# Patient Record
Sex: MALE | Race: WHITE | Employment: FULL TIME | URBAN - METROPOLITAN AREA
[De-identification: names, ages, dates, MRNs, and addresses within clinical notes are randomized per-mention and may not be internally consistent; named-entity substitution may affect disease eponyms.]

---

## 2019-02-21 ENCOUNTER — OFFICE VISIT (OUTPATIENT)
Dept: NEUROLOGY | Age: 49
End: 2019-02-21

## 2019-02-21 VITALS
SYSTOLIC BLOOD PRESSURE: 120 MMHG | WEIGHT: 212 LBS | HEIGHT: 72 IN | BODY MASS INDEX: 28.71 KG/M2 | DIASTOLIC BLOOD PRESSURE: 78 MMHG

## 2019-02-21 DIAGNOSIS — R29.898 LEFT ARM WEAKNESS: ICD-10-CM

## 2019-02-21 DIAGNOSIS — R20.0 NUMBNESS OF UPPER LIMB: ICD-10-CM

## 2019-02-21 DIAGNOSIS — M54.41 BILATERAL LOW BACK PAIN WITH BILATERAL SCIATICA, UNSPECIFIED CHRONICITY: ICD-10-CM

## 2019-02-21 DIAGNOSIS — M54.42 BILATERAL LOW BACK PAIN WITH BILATERAL SCIATICA, UNSPECIFIED CHRONICITY: ICD-10-CM

## 2019-02-21 DIAGNOSIS — M54.16 LUMBAR RADICULITIS: ICD-10-CM

## 2019-02-21 DIAGNOSIS — M54.2 NECK PAIN: ICD-10-CM

## 2019-02-21 DIAGNOSIS — R29.2 ABNORMAL DTR (DEEP TENDON REFLEX): ICD-10-CM

## 2019-02-21 DIAGNOSIS — M54.2 NECK PAIN: Primary | ICD-10-CM

## 2019-02-21 RX ORDER — CYCLOBENZAPRINE HCL 10 MG
10 TABLET ORAL
COMMUNITY

## 2019-02-21 RX ORDER — TADALAFIL 5 MG/1
5 TABLET ORAL
COMMUNITY

## 2019-02-21 RX ORDER — IBUPROFEN 800 MG/1
TABLET ORAL
COMMUNITY

## 2019-02-21 RX ORDER — CEPHALEXIN 500 MG/1
500 CAPSULE ORAL 2 TIMES DAILY
COMMUNITY
End: 2019-03-27 | Stop reason: ALTCHOICE

## 2019-02-21 NOTE — PROCEDURES
EMG/ NCS Report  DR[facility] REHABILITATION  - DAY ONE [facility]ID[facility]  [facility]  [facility], [address]   [address]   Ph: [phone]   FAX: [phone]/ [phone]  Test Date:  2019    Patient: Shannon Range : 1970 Physician: Jarvis Khan MD   Sex: Male Height: ' \" Ref Phys: Kita Nuñez MD   ID#: [id number] Weight:  lbs. Technician: Paddy Singh     Patient History / Exam:  Phan Reyes S/P C6-C7 ACDF  (+) L neck pain and L shoulder pain    Patient is coming for radiculopathy evaluation. EMG & NCV Findings:  Evaluation of the left median motor and the right median motor nerves showed normal distal onset latency (L3.2, R3.1 ms), normal amplitude (L11.2, R9.0 mV), and normal conduction velocity (Elbow-Wrist, L49, R50 m/s). The left ulnar motor and the right ulnar motor nerves showed normal distal onset latency (L2.7, R2.6 ms), normal amplitude (L12.2, R10.0 mV), normal conduction velocity (B Elbow-Wrist, L56, R66 m/s), and normal conduction velocity (A Elbow-B Elbow, L59, R62 m/s). The left median sensory, the right median sensory, the left radial sensory, the right radial sensory, the left ulnar sensory, and the right ulnar sensory nerves showed normal distal peak latency (L3.2, R2.9, L2.4, R2.0, L3.4, R2.9 ms) and normal amplitude (L38.2, R45.3, L53.4, R64.5, L32.2, R32.2 µV). All left vs. right side differences were within normal limits. All F Wave latencies were within normal limits. All F Wave left vs. right side latency differences were within normal limits. All examined muscles (as indicated in the following table) showed no evidence of electrical instability. Impression:    Extensive electrodiagnostic examination of the left and right upper extremities is normal.    Specifically, there is no evidence of a peripheral neuropathy or cervical motor radiculopathy.         Jarvis Khan MD  Diplomate, American Board of Psychiatry and Neurology  Diplomate, Neuromuscular Medicine  Diplomate, American Board of Electrodiagnostic Medicine  Director, [address] [place] [address] Accredited Laboratory with Exemplary Status        Nerve Conduction Studies  Anti Sensory Summary Table     Stim Site NR Peak (ms) Norm Peak (ms) P-T Amp (µV) Norm P-T Amp Site1 Site2 Dist (cm)   Left Median Anti Sensory (2nd Digit)  28.8°C   Wrist    3.2 <4 38.2 >13 Wrist 2nd Digit 14.0   Right Median Anti Sensory (2nd Digit)  31.7°C   Wrist    2.9 <4 45.3 >13 Wrist 2nd Digit 14.0   Left Radial Anti Sensory (Base 1st Digit)  29.6°C   Wrist    2.4 <2.8 53.4 >11 Wrist Base 1st Digit 10.0   Right Radial Anti Sensory (Base 1st Digit)  31.3°C   Wrist    2.0 <2.8 64.5 >11 Wrist Base 1st Digit 10.0   Left Ulnar Anti Sensory (5th Digit)  29.3°C   Wrist    3.4 <4.0 32.2 >9 Wrist 5th Digit 14.0   Right Ulnar Anti Sensory (5th Digit)  31.3°C   Wrist    2.9 <4.0 32.2 >9 Wrist 5th Digit 14.0     Motor Summary Table     Stim Site NR Onset (ms) Norm Onset (ms) O-P Amp (mV) Norm O-P Amp Amp (Prev) (%) Site1 Site2 Dist (cm) Ramos (m/s) Norm Ramos (m/s)   Left Median Motor (Abd Poll Brev)  29.6°C   Wrist    3.2 <4.5 11.2 >4.1 100.0 Wrist Abd Poll Brev 8.0     Elbow    7.7  11.0  98.2 Elbow Wrist 22.0 49 >49   Right Median Motor (Abd Poll Brev)  31°C   Wrist    3.1 <4.5 9.0 >4.1 100.0 Wrist Abd Poll Brev 8.0     Elbow    7.4  10.2  113.3 Elbow Wrist 21.5 50 >49   Left Ulnar Motor (Abd Dig Minimi)  32.5°C   Wrist    2.7 <3.1 12.2 >7.0 100.0 Wrist Abd Dig Minimi 8.0  >50   B Elbow    7.0  10.7  87.7 B Elbow Wrist 24.0 56 >50   A Elbow    8.7  10.5  98.1 A Elbow B Elbow 10.0 59 >50   Right Ulnar Motor (Abd Dig Minimi)  30.7°C   Wrist    2.6 <3.1 10.0 >7.0 100.0 Wrist Abd Dig Minimi 8.0  >50   B Elbow    6.1  9.8  98.0 B Elbow Wrist 23.0 66 >50   A Elbow    7.7  9.7  99.0 A Elbow B Elbow 10.0 62 >50     F Wave Studies     NR F-Lat (ms) Lat Norm (ms) L-R F-Lat (ms) L-R Lat Norm   Left Ulnar (Mrkrs) (Abd Dig Min)  31.1°C 27.36 <32 0.00 <2.5   Right Ulnar (Mrkrs) (Abd Dig Min)  30.8°C      27.36 <32 0.00 <2.5     EMG     Side Muscle Nerve Root Ins Act Fibs Psw Recrt Duration Amp Poly Comment   Left 1stDorInt Ulnar C8-T1 Nml Nml Nml Nml Nml Nml Nml    Left ExtIndicis Radial (Post Int) C7-8 Nml Nml Nml Nml Nml Nml Nml    Left Abd Poll Brev Median C8-T1 Nml Nml Nml Nml Nml Nml Nml    Left PronatorTeres Median C6-7 Nml Nml Nml Nml Nml Nml Nml    Left Biceps Musculocut C5-6 Nml Nml Nml Nml Nml Nml Nml    Left Triceps Radial C6-7-8 Nml Nml Nml Nml Nml Nml Nml    Left Deltoid Axillary C5-6 Nml Nml Nml Nml Nml Nml Nml    Right 1stDorInt Ulnar C8-T1 Nml Nml Nml Nml Nml Nml Nml    Right ExtIndicis Radial (Post Int) C7-8 Nml Nml Nml Nml Nml Nml Nml    Right Abd Poll Brev Median C8-T1 Nml Nml Nml Nml Nml Nml Nml    Right Biceps Musculocut C5-6 Nml Nml Nml Nml Nml Nml Nml    Right Triceps Radial C6-7-8 Nml Nml Nml Nml Nml Nml Nml    Right Deltoid Axillary C5-6 Nml Nml Nml Nml Nml Nml Nml                Nerve Conduction Studies  Anti Sensory Left/Right Comparison     Stim Site L Lat (ms) R Lat (ms) L-R Lat (ms) L Amp (µV) R Amp (µV) L-R Amp (%) Site1 Site2 L Ramos (m/s) R Ramos (m/s) L-R Ramos (m/s)   Median Anti Sensory (2nd Digit)  28.8°C   Wrist 2.6 2.3 0.3 38.2 45.3 15.7 Wrist 2nd Digit 54 61 7   Radial Anti Sensory (Base 1st Digit)  29.6°C   Wrist 1.9 1.5 0.4 53.4 64.5 17.2 Wrist Base 1st Digit 53 67 14   Ulnar Anti Sensory (5th Digit)  29.3°C   Wrist 2.6 2.3 0.3 32.2 32.2 0.0 Wrist 5th Digit 54 61 7     Motor Left/Right Comparison     Stim Site L Lat (ms) R Lat (ms) L-R Lat (ms) L Amp (mV) R Amp (mV) L-R Amp (%) Site1 Site2 L Ramos (m/s) R Ramos (m/s) L-R Ramos (m/s)   Median Motor (Abd Poll Brev)  29.6°C   Wrist 3.2 3.1 0.1 11.2 9.0 19.6 Wrist Abd Poll Brev      Elbow 7.7 7.4 0.3 11.0 10.2 7.3 Elbow Wrist 49 50 1   Ulnar Motor (Abd Dig Minimi)  32.5°C   Wrist 2.7 2.6 0.1 12.2 10.0 18.0 Wrist Abd Dig Minimi      B Elbow 7.0 6.1 0.9 10.7 9.8 8.4 B Elbow Wrist 56 66 10   A Elbow 8.7 7.7 1.0 10.5 9.7 7.6 A Elbow B Elbow 59 62 3         Waveforms:

## 2019-02-21 NOTE — PATIENT INSTRUCTIONS
Advance Directives: Care Instructions Your Care Instructions An advance directive is a legal way to state your wishes at the end of your life. It tells your family and your doctor what to do if you can no longer say what you want. There are two main types of advance directives. You can change them any time that your wishes change. · A living will tells your family and your doctor your wishes about life support and other treatment. · A durable power of  for health care lets you name a person to make treatment decisions for you when you can't speak for yourself. This person is called a health care agent. If you do not have an advance directive, decisions about your medical care may be made by a doctor or a  who doesn't know you. It may help to think of an advance directive as a gift to the people who care for you. If you have one, they won't have to make tough decisions by themselves. Follow-up care is a key part of your treatment and safety. Be sure to make and go to all appointments, and call your doctor if you are having problems. It's also a good idea to know your test results and keep a list of the medicines you take. How can you care for yourself at home? · Discuss your wishes with your loved ones and your doctor. This way, there are no surprises. · Many states have a unique form. Or you might use a universal form that has been approved by many states. This kind of form can sometimes be completed and stored online. Your electronic copy will then be available wherever you have a connection to the Internet. In most cases, doctors will respect your wishes even if you have a form from a different state. · You don't need a  to do an advance directive. But you may want to get legal advice. · Think about these questions when you prepare an advance directive: 
?  Who do you want to make decisions about your medical care if you are not able to? Many people choose a family member or close friend. ? Do you know enough about life support methods that might be used? If not, talk to your doctor so you understand. ? What are you most afraid of that might happen? You might be afraid of having pain, losing your independence, or being kept alive by machines. ? Where would you prefer to die? Choices include your home, a hospital, or a nursing home. ? Would you like to have information about hospice care to support you and your family? ? Do you want to donate organs when you die? ? Do you want certain [employer] practices performed before you die? If so, put your wishes in the advance directive. · Read your advance directive every year, and make changes as needed. When should you call for help? Be sure to contact your doctor if you have any questions. Where can you learn more? Go to http://marie-aniyah.info/. Enter R264 in the search box to learn more about \"Advance Directives: Care Instructions. \" Current as of: April 18, 2018 Content Version: 11.9 © 2337-7833 [employer]. Care instructions adapted under license by [employer] (which disclaims liability or warranty for this information). If you have questions about a medical condition or this instruction, always ask your healthcare professional. [place] [address] any warranty or liability for your use of this information. PRESCRIPTION REFILL POLICY [facility] Statement to Patients April 1, 2014 In an effort to ensure the large volume of patient prescription refills is processed in the most efficient and expeditious manner, we are asking our patients to assist us by calling your Pharmacy for all prescription refills, this will include also your  Mail Order Pharmacy. The pharmacy will contact our office electronically to continue the refill process. Please do not wait until the last minute to call your pharmacy. We need at least 48 hours (2days) to fill prescriptions. We also encourage you to call your pharmacy before going to  your prescription to make sure it is ready. With regard to controlled substance prescription refill requests (narcotic refills) that need to be picked up at our office, we ask your cooperation by providing us with at least 72 hours (3days) notice that you will need a refill. We will not refill narcotic prescription refill requests after 4:00pm on any weekday, Monday through Thursday, or after 2:00pm on Fridays, or on the weekends. We encourage everyone to explore another way of getting your prescription refill request processed using sliceX, our patient web portal through our electronic medical record system. [employer]X is an efficient and effective way to communicate your medication request directly to the office and  downloadable as an rivera on your smart phone . [employer]X also features a review functionality that allows you to view your medication list as well as leave messages for your physician. Are you ready to get connected? If so please review the attatched instructions or speak to any of our staff to get you set up right away! Thank you so much for your cooperation. Should you have any questions please contact our Practice Administrator. The Physicians and Staff,  [address] [place] [address] Neurology [facility]

## 2019-02-21 NOTE — PROGRESS NOTES
[facility] and [address] at [facility] at [address] [place], [address] [address] 
[phone] Office 
[phone] Referring: Felipe Olmos MD 
 
 
Chief Complaint Patient presents with  Neck Pain 43-year-old right-handed gentleman who presents today for neck and shoulder pain and weakness. He tells me back in 2004 he was having pain in his left shoulder and it was felt this was secondary to a torn rotator cuff. He underwent rotator cuff surgery and he continued to have discomfort about the left side of the neck into the left upper extremity. He was then found to have a second problem which was disc herniation C6-C7. He underwent C6-C7 ACDF by Dr. Jacinta Goodrich. Since that time he has continued to have discomfort in the left side of his neck radiating down into the left upper extremity about the shoulder into the pectoralis muscle and also having numbness of the thumb and first digit of the left hand and weakness in that left hand where he sometimes will drop things. He has had cervical injections. He has had multiple rounds of physical therapy. He has had dry needling. He indicates the dry needling in the physical therapy does help him. He has what he calls jumping abnormal type movements in the biceps on the left. He has tremor of the hands as well. He this has been ongoing and has been progressive. No recent injury. No recent advanced imaging. He has seen Dr. Fatemeh Hernández who asked him to be reevaluated to see if there is something else ongoing given the amount of time that has passed and the continuation of his symptoms.   The patient had an EMG prior to his surgery but not one since to determine any residual damage and also to determine whether there may be something new ongoing. Last MRI scan was 2012 and that is reviewed in the electronic medical record. He is currently back in physical therapy and he is getting some dry needling and therapy and again feels it is helpful when he does that. He also notes that he has had low back pain and he says that over the last 3 weeks this has been increasing. He says that he knows he is here for his neck but the way his back is been hurting recently he thinks that his back is more important at this point. No inciting factor. He did not fall. No changes in his activity. 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 He had no change or injury related to that. No recent infection. He says that he has pain bilaterally. Increases with activity. He has burning down his legs. His legs have not given out. Not dragging the leg. No loss of bowel or bladder. He does use some Cialis for erectile dysfunction. No imaging. Chart documentation including office visit from Dr. Akiko Bay from January 21, 2019 was kindly forwarded and reviewed. This outlines history as the patient provided above. Also includes plain films demonstrating intact cervical fusion C6-C7 as well as some straightening of the spine thought to be either positional or secondary to spasm. These films were shot 1/21/2019. Past Medical History:  
Diagnosis Date  High cholesterol Past Surgical History:  
Procedure Laterality Date  HX ORTHOPAEDIC Current Outpatient Medications Medication Sig Dispense Refill  cyclobenzaprine (FLEXERIL) 10 mg tablet Take 10 mg by mouth nightly.  tadalafil (CIALIS) 5 mg tablet Take 5 mg by mouth.  cephALEXin (KEFLEX) 500 mg capsule Take 500 mg by mouth two (2) times a day.  ibuprofen (MOTRIN) 800 mg tablet Take  by mouth.  rosuvastatin (CRESTOR) 5 mg tablet Take 5 mg by mouth nightly.     
 hydrocodone-acetaminophen (LORTAB 10/500)  mg per tablet Take 1 Tab by mouth every six (6) hours as needed for Pain. 30 Tab 0  
 duloxetine (CYMBALTA) 30 mg capsule Take 30 mg by mouth daily.  gabapentin (NEURONTIN) 300 mg capsule Take 300 mg by mouth three (3) times daily.  methylPREDNIsolone (MEDROL, CORA,) 4 mg tablet Take  by mouth. Per dose pack instructions 1 Package 0 Allergies Allergen Reactions  Penicillins Unknown (comments) Social History Tobacco Use  Smoking status: Not on file Substance Use Topics  Alcohol use: Not on file  Drug use: Not on file History reviewed. No pertinent family history. Review of Systems Pertinent positives and negatives as noted with remainder of comprehensive review negative Examination Visit Vitals /78 Ht 6' (1.829 m) Wt 96.2 kg (212 lb) BMI 28.75 kg/m² Pleasant, well appearing gentleman. He is engaging and interactive. His dress and grooming are appropriate. .  No icterus. Oropharynx clear. Supple neck without bruit. No significant spasm today about the paracervical musculature. No significant spasm about the trapezius. No trigger points palpated today. Heart regular. His pulses are symmetrical.  No edema of the lower extremities. Neurologically he is awake alert oriented and conversant. His speech and lungs are normal.  Cognitive function normal.  He has intact cranial nerves II-XII. There is no nystagmus present. He has no pronation and no drift. He has no rest tremor. He has no cogwheeling. There is no significant wasting of intrinsic hand muscles. He has postural tremor of the outstretched hands. No intention on finger-nose-finger. He is able to fully resist in the upper and lower extremities in all muscle groups to direct testing. Intrinsic hand muscles strong. Reflexes are abnormally brisk throughout in the upper and lower extremities. He has spread and crossed adductors. No clonus. Bilateral Brady's are present.   Toes are mute no ataxia. Gait steady. Sensory intact to primary. Impression/Plan 55-year-old gentleman status post cervical fusion is noted with continued discomfort and examination significant for postural tremor but also with pathologically brisk reflexes as noted and with Brady bilaterally and this would be indicative of a myelopathic process but his previous MRI scans do not demonstrate such and certainly concern would be for a new anatomic abnormality in the cervical spine that could certainly be causing his discomfort but more importantly be causing compressive phenomena on the cord giving us these examination findings as well. To that end he will get an MRI of the cervical spine looking for evidence of myelopathy. He will also get an EMG of the upper extremities to outline evidence of residual damage from his previous insult requiring surgery versus any new or new or type finding as can be delineated electrically. In terms of his low back pain my concern there certainly is that he is getting burning down both the legs with the erectile dysfunction and to that end we will get an MRI of the lumbar spine. Defer EMG of the lowers for now. Follow-up at the conclusion of his studies. Es Dey MD 
 
This note was created using voice recognition software. Despite editing, there may be syntax errors. This note will not be viewable in [address]

## 2019-02-26 ENCOUNTER — HOSPITAL ENCOUNTER (OUTPATIENT)
Dept: MRI IMAGING | Age: 49
Discharge: HOME | End: 2019-02-26
Attending: PSYCHIATRY & NEUROLOGY
Payer: COMMERCIAL

## 2019-02-26 DIAGNOSIS — R29.2 ABNORMAL DTR (DEEP TENDON REFLEX): ICD-10-CM

## 2019-02-26 DIAGNOSIS — R29.898 LEFT ARM WEAKNESS: ICD-10-CM

## 2019-02-26 DIAGNOSIS — M54.2 NECK PAIN: ICD-10-CM

## 2019-02-26 DIAGNOSIS — M54.42 BILATERAL LOW BACK PAIN WITH BILATERAL SCIATICA, UNSPECIFIED CHRONICITY: ICD-10-CM

## 2019-02-26 DIAGNOSIS — M54.41 BILATERAL LOW BACK PAIN WITH BILATERAL SCIATICA, UNSPECIFIED CHRONICITY: ICD-10-CM

## 2019-02-26 DIAGNOSIS — R20.0 NUMBNESS OF UPPER LIMB: ICD-10-CM

## 2019-02-26 DIAGNOSIS — M54.16 LUMBAR RADICULITIS: ICD-10-CM

## 2019-02-26 PROCEDURE — 72148 MRI LUMBAR SPINE W/O DYE: CPT

## 2019-02-26 PROCEDURE — 72141 MRI NECK SPINE W/O DYE: CPT

## 2019-03-27 ENCOUNTER — OFFICE VISIT (OUTPATIENT)
Dept: NEUROLOGY | Age: 49
End: 2019-03-27

## 2019-03-27 VITALS
BODY MASS INDEX: 28.75 KG/M2 | HEIGHT: 72 IN | RESPIRATION RATE: 20 BRPM | OXYGEN SATURATION: 97 % | DIASTOLIC BLOOD PRESSURE: 84 MMHG | SYSTOLIC BLOOD PRESSURE: 136 MMHG | HEART RATE: 96 BPM

## 2019-03-27 DIAGNOSIS — M54.16 LUMBAR RADICULOPATHY: Primary | ICD-10-CM

## 2019-03-27 DIAGNOSIS — M54.12 CERVICAL RADICULOPATHY: ICD-10-CM

## 2019-03-27 DIAGNOSIS — M79.18 MYOFASCIAL PAIN: ICD-10-CM

## 2019-03-27 RX ORDER — PREDNISONE 10 MG/1
TABLET ORAL
Qty: 42 TAB | Refills: 0 | Status: SHIPPED | OUTPATIENT
Start: 2019-03-27 | End: 2021-01-19 | Stop reason: ALTCHOICE

## 2019-03-27 RX ORDER — GABAPENTIN 300 MG/1
CAPSULE ORAL
Qty: 60 CAP | Refills: 3 | Status: SHIPPED | OUTPATIENT
Start: 2019-03-27 | End: 2021-02-10 | Stop reason: ALTCHOICE

## 2019-03-27 NOTE — PROGRESS NOTES
Test Results:    MRI of cerv spine   MRI of the lumbar spine   EMG       No changes regarding the neck pain still the same as when he was here for his last visit   Numbness in left arm/shoulder, thumb and index finger just on the left side of his body from his shoulder down to the end of his fingers

## 2019-03-27 NOTE — PROGRESS NOTES
Date:  19     Name:  Marcia Fernandes  :  1970  MRN:  [id number]     PCP:  Jon Kan MD    Chief Complaint   Patient presents with    Results     HISTORY OF PRESENT ILLNESS: Follow up for test results. He was seen initially by Dr. Caterina Camacho for complaints of neck, shoulder pain, and weakness. This all started around  at which time it was found that he had a torn rotator cuff in the left shoulder. After this was repaired and he continued to have pain in the neck, it was found that he has a disc herniation and underwent an ACDF at C6-C7. Despite these 2 surgeries, he continues to have he has had multiple rounds of physical therapy discomfort in the left side of his neck radiating into his left arm. He has had several rounds of physical therapy and dry needling which does help but does not completely resolve his issues. Over the last couple of months, he has also been having lower back pain. This seems to radiate down the backs of his legs. For further evaluation, he was sent for an MRI of his cervical spine as well as his lumbar spine. The cervical spine does demonstrate his prior anterior cervical disc fusion at C6-C7. There may be some mild degenerative changes now noted at C4-C5 and there is mild left foraminal stenosis in this area. There is no significant spinal canal stenosis. Lumbar spine MRI demonstrated mild degenerative changes without stenosis. Except as noted above, denies  fever, chills, cough. No CP or SOB. No dysuria, loss of bowel or bladder control. No Weight loss. Appetite good. Sleeping well. No sweats. No edema. No bruising or bleeding. No nausea or vomit. No diarrhea. No frequency, urgency, No depressive sxs. No anxiety. Denies sore throat, nasal congestion, nasal discharge, epistaxis, tinnitus, hearing loss, back pain, muscle pain, or joint pain.        Current Outpatient Medications   Medication Sig    cyclobenzaprine (FLEXERIL) 10 mg tablet Take 10 mg by mouth nightly.  tadalafil (CIALIS) 5 mg tablet Take 5 mg by mouth.  ibuprofen (MOTRIN) 800 mg tablet Take  by mouth.  rosuvastatin (CRESTOR) 5 mg tablet Take 5 mg by mouth nightly.  hydrocodone-acetaminophen (LORTAB 10/500)  mg per tablet Take 1 Tab by mouth every six (6) hours as needed for Pain. No current facility-administered medications for this visit.       Allergies   Allergen Reactions    Penicillins Unknown (comments)     Past Medical History:   Diagnosis Date    High cholesterol      Past Surgical History:   Procedure Laterality Date    HX ORTHOPAEDIC       Social History     Socioeconomic History    Marital status:      Spouse name: Not on file    Number of children: Not on file    Years of education: Not on file    Highest education level: Not on file   Occupational History    Not on file   Social Needs    Financial resource strain: Not on file    Food insecurity:     Worry: Not on file     Inability: Not on file    Transportation needs:     Medical: Not on file     Non-medical: Not on file   Tobacco Use    Smoking status: Not on file   Substance and Sexual Activity    Alcohol use: Not on file    Drug use: Not on file    Sexual activity: Not on file   Lifestyle    Physical activity:     Days per week: Not on file     Minutes per session: Not on file    Stress: Not on file   Relationships    Social connections:     Talks on phone: Not on file     Gets together: Not on file     Attends Orthodox service: Not on file     Active member of club or organization: Not on file     Attends meetings of clubs or organizations: Not on file     Relationship status: Not on file    Intimate partner violence:     Fear of current or ex partner: Not on file     Emotionally abused: Not on file     Physically abused: Not on file     Forced sexual activity: Not on file   Other Topics Concern    Not on file   Social History Narrative    Not on file History reviewed. No pertinent family history. PHYSICAL EXAMINATION:    Visit Vitals  /84   Pulse 96   Resp 20   Ht 6' (1.829 m)   SpO2 97%   BMI 28.75 kg/m²     General:  Well defined, nourished, and groomed individual in no acute distress. Neck: Supple, nontender, no bruits, no pain with resistance to active range of motion. Heart: Regular rate and rhythm, no murmurs, rub, or gallop. Normal S1S2. Lungs:  Clear to auscultation bilaterally with equal chest expansion, no cough, no wheeze  Musculoskeletal:  Extremities revealed no edema and had full range of motion of joints. Psych:  Good mood and bright affect    NEUROLOGICAL EXAMINATION:     Mental Status:   Alert and oriented to person, place, and time     Cranial Nerves:    II, III, IV, VI:  Visual acuity grossly intact. Visual fields are normal.    Pupils are equal, round, and reactive to light and accommodation. Extra-ocular movements are full and fluid. Fundoscopic exam was benign, no ptosis or nystagmus. V-XII: Hearing is grossly intact. Facial features are symmetric, with normal sensation and strength. The palate rises symmetrically and the tongue protrudes midline. Sternocleidomastoids 5/5. Motor Examination: Normal tone, bulk, and strength, 5/5 muscle strength throughout. Coordination:  No resting or intention tremor    Gait and Station:  Steady while walking. Normal arm swing. No pronator drift. No muscle wasting or fasiculations noted. Reflexes:  DTRs 2+ throughout. ASSESSMENT AND PLAN    ICD-10-CM ICD-9-CM    1. Lumbar radiculopathy M54.16 724. 4 predniSONE (DELTASONE) 10 mg tablet      REFERRAL TO PHYSICAL THERAPY   2. Cervical radiculopathy M54.12 723.4 gabapentin (NEURONTIN) 300 mg capsule      REFERRAL TO PHYSICAL THERAPY   3. Myofascial pain M79.18 729.1 REFERRAL TO PHYSICAL THERAPY     Ongoing myofascial pain secondary to cervical radiculopathy.   He may simply have some residual pain that did not resolve after his anterior cervical fusion. It does not sound as though it has progressed just that it is still present. He does have some mild left foraminal stenosis noted at the level above his previous fusion but this does not appear to be causing any significant stenosis or compression of the nerve root. With regard to his lumbar radicular pain, he does have degenerative disc disease without stenosis. Recommended he undergo conservative therapy with prednisone taper, muscle relaxants which he already has, and physical therapy. He was also provided with gabapentin for the neuropathic pain related to both the cervical and lumbar issues. Purpose and potential side effects were discussed and he has verbalized understanding. Follow-up in 3 months or sooner if needed. Petra Burr

## 2019-03-28 ENCOUNTER — TELEPHONE (OUTPATIENT)
Dept: NEUROLOGY | Age: 49
End: 2019-03-28

## 2019-03-28 NOTE — TELEPHONE ENCOUNTER
Gulshan Stanley was in the office today stated that there was a third mediation that was talked about but wasn't sent to the pharmacy. If the nurse could call the patient to figure out if it should have been sent in or not. Gulshan Stanley stated that it was something to take just in case the other two medications( gabapentin and prednison)  had a interaction.

## 2019-04-02 NOTE — TELEPHONE ENCOUNTER
We talked about a muscle relaxant but he already has one of those per NP.      Left Message - on home number listed letting him know of the previous message sent from the NP.

## 2019-04-18 ENCOUNTER — TELEPHONE (OUTPATIENT)
Dept: NEUROLOGY | Age: 49
End: 2019-04-18

## 2019-04-18 NOTE — TELEPHONE ENCOUNTER
[facility] called and is requesting an update in regards to script for dry needling. They have faxed 3 requests. Most recent is in provider folder.

## 2019-04-18 NOTE — TELEPHONE ENCOUNTER
Have been trying to fax order for several days now and it keeps coming back that the line was busy. Finally went through this morning. Received confirmation. Paperwork will be scanned into his chart.

## 2019-06-21 ENCOUNTER — OFFICE VISIT (OUTPATIENT)
Dept: NEUROLOGY | Age: 49
End: 2019-06-21

## 2019-06-21 VITALS
SYSTOLIC BLOOD PRESSURE: 124 MMHG | OXYGEN SATURATION: 98 % | HEIGHT: 72 IN | HEART RATE: 74 BPM | RESPIRATION RATE: 20 BRPM | BODY MASS INDEX: 28.75 KG/M2 | DIASTOLIC BLOOD PRESSURE: 88 MMHG

## 2019-06-21 DIAGNOSIS — M54.12 CERVICAL RADICULOPATHY: ICD-10-CM

## 2019-06-21 DIAGNOSIS — M79.18 MYOFASCIAL PAIN: ICD-10-CM

## 2019-06-21 DIAGNOSIS — M54.16 LUMBAR RADICULOPATHY: Primary | ICD-10-CM

## 2019-06-21 RX ORDER — HYDROCODONE BITARTRATE AND ACETAMINOPHEN 10; 325 MG/1; MG/1
1 TABLET ORAL
Refills: 0 | COMMUNITY
Start: 2019-06-14

## 2019-06-21 RX ORDER — ALPRAZOLAM 0.5 MG/1
0.5 TABLET ORAL AS NEEDED
Refills: 2 | COMMUNITY
Start: 2019-06-10

## 2019-06-21 NOTE — PROGRESS NOTES
He couldn't take the gabapentin it really knocked him out and couldn't work with taking it   He does the dry needling and he stated that has really helped more than anything   He takes the motrin as needed and that seems to pretty much take care of it

## 2019-07-21 NOTE — PROGRESS NOTES
Date:  19     Name:  Arian Jackson  :  1970  MRN:  [id number]     PCP:  Marquita Tay MD    Chief Complaint   Patient presents with    Follow-up     lumbar radiculopathy      HISTORY OF PRESENT ILLNESS: Follow up for ongoing myofascial pain secondary to cervical radiculopathy. At his last office visit, we discussed that he may simply have some residual pain that did not resolve after his anterior cervical fusion. With regard to his lumbar radicular pain, he does have degenerative disc disease without stenosis and he was treated with conservative therapy of a prednisone taper, muscle relaxants, and physical therapy. He was also provided with gabapentin but he was not able to tolerate this as it made him too tired. He indicates today that for the most part he is able to manage his discomfort easily with Motrin. He only takes this as needed. Except as noted above, denies  fever, chills, cough. No CP or SOB. No dysuria, loss of bowel or bladder control. No Weight loss. Appetite good. Sleeping well. No sweats. No edema. No bruising or bleeding. No nausea or vomit. No diarrhea. No frequency, urgency, No depressive sxs. No anxiety. Denies sore throat, nasal congestion, nasal discharge, epistaxis, tinnitus, hearing loss, back pain, muscle pain, or joint pain. Current Outpatient Medications   Medication Sig    ALPRAZolam (XANAX) 0.5 mg tablet     HYDROcodone-acetaminophen (NORCO) 7.5-325 mg per tablet     cyclobenzaprine (FLEXERIL) 10 mg tablet Take 10 mg by mouth nightly.  tadalafil (CIALIS) 5 mg tablet Take 5 mg by mouth.  ibuprofen (MOTRIN) 800 mg tablet Take  by mouth.  rosuvastatin (CRESTOR) 5 mg tablet Take 5 mg by mouth nightly.     gabapentin (NEURONTIN) 300 mg capsule Take one po bid prn pain and one po QHS    predniSONE (DELTASONE) 10 mg tablet 6 po x2 days, 5 po x 2days, 4 po x 2days, 3 po x2days, 2 po x2days, 1 po x 2days    hydrocodone-acetaminophen (LORTAB 10/500)  mg per tablet Take 1 Tab by mouth every six (6) hours as needed for Pain. No current facility-administered medications for this visit. Allergies   Allergen Reactions    Penicillins Unknown (comments)     Past Medical History:   Diagnosis Date    High cholesterol      Past Surgical History:   Procedure Laterality Date    HX ORTHOPAEDIC       Social History     Socioeconomic History    Marital status:      Spouse name: Not on file    Number of children: Not on file    Years of education: Not on file    Highest education level: Not on file   Occupational History    Not on file   Social Needs    Financial resource strain: Not on file    Food insecurity:     Worry: Not on file     Inability: Not on file    Transportation needs:     Medical: Not on file     Non-medical: Not on file   Tobacco Use    Smoking status: Not on file   Substance and Sexual Activity    Alcohol use: Not on file    Drug use: Not on file    Sexual activity: Not on file   Lifestyle    Physical activity:     Days per week: Not on file     Minutes per session: Not on file    Stress: Not on file   Relationships    Social connections:     Talks on phone: Not on file     Gets together: Not on file     Attends Jewish service: Not on file     Active member of club or organization: Not on file     Attends meetings of clubs or organizations: Not on file     Relationship status: Not on file    Intimate partner violence:     Fear of current or ex partner: Not on file     Emotionally abused: Not on file     Physically abused: Not on file     Forced sexual activity: Not on file   Other Topics Concern    Not on file   Social History Narrative    Not on file     No family history on file.     PHYSICAL EXAMINATION:    Visit Vitals  /88   Pulse 74   Resp 20   Ht 6' (1.829 m)   SpO2 98%   BMI 28.75 kg/m²     General:  Well defined, nourished, and groomed individual in no acute distress. Neck: Supple, nontender, no bruits, no pain with resistance to active range of motion. Heart: Regular rate and rhythm, no murmurs, rub, or gallop. Normal S1S2. Lungs:  Clear to auscultation bilaterally with equal chest expansion, no cough, no wheeze  Musculoskeletal:  Extremities revealed no edema and had full range of motion of joints. Psych:  Good mood and bright affect    NEUROLOGICAL EXAMINATION:     Mental Status:   Alert and oriented to person, place, and time     Cranial Nerves:    II, III, IV, VI:  Visual acuity grossly intact. Visual fields are normal.    Pupils are equal, round, and reactive to light and accommodation. Extra-ocular movements are full and fluid. Fundoscopic exam was benign, no ptosis or nystagmus. V-XII: Hearing is grossly intact. Facial features are symmetric, with normal sensation and strength. The palate rises symmetrically and the tongue protrudes midline. Sternocleidomastoids 5/5. Motor Examination: Normal tone, bulk, and strength, 5/5 muscle strength throughout. Coordination:  No resting or intention tremor    Gait and Station:  Steady while walking. Normal arm swing. No pronator drift. No muscle wasting or fasiculations noted. Reflexes:  DTRs 2+ throughout. ASSESSMENT AND PLAN    ICD-10-CM ICD-9-CM    1. Lumbar radiculopathy M54.16 724.4    2. Cervical radiculopathy M54.12 723.4    3. Myofascial pain M79.18 729.1      Unfortunately, I think he has some residual pain that will likely not improve from his ACF. The lumbar radicular symptoms are improved with conservative measures. His pain overall is not unmanageable at this point. He will continue with conservative measures. Follow up as needed. Petra Chow

## 2021-01-06 ENCOUNTER — NURSE TRIAGE (OUTPATIENT)
Dept: OTHER | Facility: CLINIC | Age: 51
End: 2021-01-06

## 2021-01-06 NOTE — TELEPHONE ENCOUNTER
Reason for Disposition   General information question, no triage required and triager able to answer question    Protocols used: INFORMATION ONLY CALL - NO TRIAGE-ADULT-    Received call from  at [address] [place] [address]. Patient was calling to schedule appt with Neuro regarding a back injury. He was referred by his provider to see neuro. He denies new or worsening sx. Call Soft transferred to [place] to assist in scheduling appointment. Attention Provider: Thank you for allowing me to participate in the care of your patient. The  patient was connected to triage in response to information provided to the [facility]. Please do not respond through this encounter as the response is not directed to a shared pool.

## 2021-01-19 ENCOUNTER — OFFICE VISIT (OUTPATIENT)
Dept: NEUROLOGY | Age: 51
End: 2021-01-19
Payer: COMMERCIAL

## 2021-01-19 VITALS
DIASTOLIC BLOOD PRESSURE: 84 MMHG | HEART RATE: 79 BPM | SYSTOLIC BLOOD PRESSURE: 130 MMHG | WEIGHT: 212 LBS | BODY MASS INDEX: 28.75 KG/M2 | RESPIRATION RATE: 18 BRPM | OXYGEN SATURATION: 97 % | TEMPERATURE: 97 F

## 2021-01-19 DIAGNOSIS — S32.059D CLOSED FRACTURE OF FIFTH LUMBAR VERTEBRA WITH ROUTINE HEALING, UNSPECIFIED FRACTURE MORPHOLOGY, SUBSEQUENT ENCOUNTER: ICD-10-CM

## 2021-01-19 DIAGNOSIS — M54.16 LUMBAR RADICULOPATHY: Primary | ICD-10-CM

## 2021-01-19 DIAGNOSIS — R32 URINARY INCONTINENCE, UNSPECIFIED TYPE: ICD-10-CM

## 2021-01-19 PROCEDURE — 99214 OFFICE O/P EST MOD 30 MIN: CPT | Performed by: PSYCHIATRY & NEUROLOGY

## 2021-01-19 RX ORDER — DICLOFENAC SODIUM 75 MG/1
TABLET, DELAYED RELEASE ORAL
COMMUNITY
Start: 2020-10-02

## 2021-01-19 RX ORDER — TESTOSTERONE 20.25 MG/1.25G
GEL TOPICAL
COMMUNITY
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
## 2021-01-19 NOTE — PROGRESS NOTES
Chief Complaint   Patient presents with    Fracture     L5    Extremity Weakness     with L leg pain         Has L5 fx and since he is having pain shooting down left leg, Dr. Luis M Young wanted pt to see Dr. Taryn Mcdermott, has not seen ortho/neurosurgery.

## 2021-01-22 ENCOUNTER — HOSPITAL ENCOUNTER (OUTPATIENT)
Dept: MRI IMAGING | Age: 51
Discharge: HOME | End: 2021-01-22
Attending: PSYCHIATRY & NEUROLOGY
Payer: COMMERCIAL

## 2021-01-22 DIAGNOSIS — R32 URINARY INCONTINENCE, UNSPECIFIED TYPE: ICD-10-CM

## 2021-01-22 DIAGNOSIS — M54.16 LUMBAR RADICULOPATHY: ICD-10-CM

## 2021-01-22 DIAGNOSIS — S32.059D CLOSED FRACTURE OF FIFTH LUMBAR VERTEBRA WITH ROUTINE HEALING, UNSPECIFIED FRACTURE MORPHOLOGY, SUBSEQUENT ENCOUNTER: ICD-10-CM

## 2021-01-22 PROCEDURE — 72148 MRI LUMBAR SPINE W/O DYE: CPT

## 2021-01-28 ENCOUNTER — TELEPHONE (OUTPATIENT)
Dept: NEUROLOGY | Age: 51
End: 2021-01-28

## 2021-01-28 NOTE — TELEPHONE ENCOUNTER
----- Message from Lorne Celaya sent at 1/28/2021 11:47 AM EST -----  Regarding: Dr Mayank Max  Pt is calling to get his MRI Test Result, please call [phone]

## 2021-01-29 NOTE — TELEPHONE ENCOUNTER
Pt notified results are not discussed via phone unless something urgent needs addressed sooner than appt. Did let him know it was unchanged.   Pt has emg and appt with Dr. Nelia Sow after on 2/10/21

## 2021-02-10 ENCOUNTER — OFFICE VISIT (OUTPATIENT)
Dept: NEUROLOGY | Age: 51
End: 2021-02-10
Payer: COMMERCIAL

## 2021-02-10 VITALS
DIASTOLIC BLOOD PRESSURE: 90 MMHG | WEIGHT: 212 LBS | OXYGEN SATURATION: 96 % | BODY MASS INDEX: 28.75 KG/M2 | RESPIRATION RATE: 18 BRPM | HEART RATE: 96 BPM | SYSTOLIC BLOOD PRESSURE: 132 MMHG | TEMPERATURE: 97 F

## 2021-02-10 VITALS — TEMPERATURE: 97.7 F

## 2021-02-10 DIAGNOSIS — M54.42 BILATERAL LOW BACK PAIN WITH BILATERAL SCIATICA, UNSPECIFIED CHRONICITY: Primary | ICD-10-CM

## 2021-02-10 DIAGNOSIS — M54.16 LUMBAR RADICULOPATHY: Primary | ICD-10-CM

## 2021-02-10 DIAGNOSIS — M54.41 BILATERAL LOW BACK PAIN WITH BILATERAL SCIATICA, UNSPECIFIED CHRONICITY: Primary | ICD-10-CM

## 2021-02-10 PROCEDURE — 95886 MUSC TEST DONE W/N TEST COMP: CPT | Performed by: PSYCHIATRY & NEUROLOGY

## 2021-02-10 PROCEDURE — 99214 OFFICE O/P EST MOD 30 MIN: CPT | Performed by: PSYCHIATRY & NEUROLOGY

## 2021-02-10 PROCEDURE — 95911 NRV CNDJ TEST 9-10 STUDIES: CPT | Performed by: PSYCHIATRY & NEUROLOGY
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
## 2021-02-10 NOTE — PATIENT INSTRUCTIONS
PRESCRIPTION REFILL POLICY 
 
[facility]  
Statement to Patients 
April 1, 2014 
 
 
In an effort to ensure the large volume of patient prescription refills is processed in the most efficient and expeditious manner, we are asking our patients to assist us by calling your Pharmacy for all prescription refills, this will include also your  Mail Order Pharmacy. The pharmacy will contact our office electronically to continue the refill process. 
 
Please do not wait until the last minute to call your pharmacy. We need at least 48 hours (2days) to fill prescriptions. We also encourage you to call your pharmacy before going to  your prescription to make sure it is ready.  
 
With regard to controlled substance prescription refill requests (narcotic refills) that need to be picked up at our office, we ask your cooperation by providing us with at least 72 hours (3days) notice that you will need a refill. 
 
We will not refill narcotic prescription refill requests after 4:00pm on any weekday, Monday through Thursday, or after 2:00pm on Fridays, or on the weekends.  
  
We encourage everyone to explore another way of getting your prescription refill request processed using [employer], our patient web portal through our electronic medical record system. [employer] is an efficient and effective way to communicate your medication request directly to the office and  downloadable as an rivera on your smart phone . [employer] also features a review functionality that allows you to view your medication list as well as leave messages for your physician. Are you ready to get connected? If so please review the attatched instructions or speak to any of our staff to get you set up right away! 
 
Thank you so much for your cooperation. Should you have any questions please contact our Practice Administrator. 
 
The Physicians and Staff,  [facility]
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
## 2021-10-11 ENCOUNTER — TRANSCRIBE ORDER (OUTPATIENT)
Dept: SCHEDULING | Age: 51
End: 2021-10-11

## 2021-10-11 DIAGNOSIS — R10.13 EPIGASTRIC PAIN: Primary | ICD-10-CM

## 2021-10-11 DIAGNOSIS — R10.11 RIGHT UPPER QUADRANT PAIN: ICD-10-CM

## 2021-10-14 ENCOUNTER — HOSPITAL ENCOUNTER (OUTPATIENT)
Dept: NUCLEAR MEDICINE | Age: 51
Discharge: HOME | End: 2021-10-14
Attending: SPECIALIST
Payer: COMMERCIAL

## 2021-10-14 VITALS — WEIGHT: 225 LBS | BODY MASS INDEX: 30.52 KG/M2

## 2021-10-14 DIAGNOSIS — R10.13 EPIGASTRIC PAIN: ICD-10-CM

## 2021-10-14 DIAGNOSIS — R10.11 RIGHT UPPER QUADRANT PAIN: ICD-10-CM

## 2021-10-14 PROCEDURE — 74011250636 HC RX REV CODE- 250/636: Performed by: SPECIALIST

## 2021-10-14 PROCEDURE — 74011000258 HC RX REV CODE- 258: Performed by: SPECIALIST

## 2021-10-14 PROCEDURE — 78227 HEPATOBIL SYST IMAGE W/DRUG: CPT

## 2021-10-14 RX ORDER — KIT FOR THE PREPARATION OF TECHNETIUM TC 99M MEBROFENIN 45 MG/10ML
5.2 INJECTION, POWDER, LYOPHILIZED, FOR SOLUTION INTRAVENOUS
Status: COMPLETED | OUTPATIENT
Start: 2021-10-14 | End: 2021-10-14

## 2021-10-14 RX ORDER — SODIUM CHLORIDE 9 MG/ML
25 INJECTION, SOLUTION INTRAVENOUS ONCE
Status: COMPLETED | OUTPATIENT
Start: 2021-10-14 | End: 2021-10-14

## 2021-10-14 RX ADMIN — SODIUM CHLORIDE 25 ML: 900 INJECTION, SOLUTION INTRAVENOUS at 13:27

## 2021-10-14 RX ADMIN — KIT FOR THE PREPARATION OF TECHNETIUM TC 99M MEBROFENIN 5.2 MILLICURIE: 45 INJECTION, POWDER, LYOPHILIZED, FOR SOLUTION INTRAVENOUS at 12:20

## 2021-10-14 RX ADMIN — SINCALIDE 2.04 MCG: 5 INJECTION, POWDER, LYOPHILIZED, FOR SOLUTION INTRAVENOUS at 13:27
